# Patient Record
Sex: MALE | Race: OTHER | HISPANIC OR LATINO
[De-identification: names, ages, dates, MRNs, and addresses within clinical notes are randomized per-mention and may not be internally consistent; named-entity substitution may affect disease eponyms.]

---

## 2021-03-09 PROBLEM — Z00.00 ENCOUNTER FOR PREVENTIVE HEALTH EXAMINATION: Status: ACTIVE | Noted: 2021-03-09

## 2021-03-16 ENCOUNTER — NON-APPOINTMENT (OUTPATIENT)
Age: 86
End: 2021-03-16

## 2021-03-16 ENCOUNTER — APPOINTMENT (OUTPATIENT)
Dept: CARDIOLOGY | Facility: CLINIC | Age: 86
End: 2021-03-16
Payer: MEDICARE

## 2021-03-16 VITALS
RESPIRATION RATE: 12 BRPM | WEIGHT: 160 LBS | TEMPERATURE: 97.5 F | DIASTOLIC BLOOD PRESSURE: 80 MMHG | OXYGEN SATURATION: 99 % | BODY MASS INDEX: 25.71 KG/M2 | HEART RATE: 75 BPM | HEIGHT: 66 IN | SYSTOLIC BLOOD PRESSURE: 170 MMHG

## 2021-03-16 DIAGNOSIS — Z82.49 FAMILY HISTORY OF ISCHEMIC HEART DISEASE AND OTHER DISEASES OF THE CIRCULATORY SYSTEM: ICD-10-CM

## 2021-03-16 DIAGNOSIS — Z86.39 PERSONAL HISTORY OF OTHER ENDOCRINE, NUTRITIONAL AND METABOLIC DISEASE: ICD-10-CM

## 2021-03-16 PROCEDURE — 93000 ELECTROCARDIOGRAM COMPLETE: CPT

## 2021-03-16 PROCEDURE — 99204 OFFICE O/P NEW MOD 45 MIN: CPT

## 2021-03-16 RX ORDER — LISINOPRIL 20 MG/1
20 TABLET ORAL DAILY
Refills: 0 | Status: DISCONTINUED | COMMUNITY
End: 2021-03-16

## 2021-03-16 RX ORDER — BLOOD-GLUCOSE METER
KIT MISCELLANEOUS
Qty: 1 | Refills: 0 | Status: ACTIVE | COMMUNITY
Start: 2021-03-16 | End: 1900-01-01

## 2021-03-16 RX ORDER — METFORMIN HYDROCHLORIDE 850 MG/1
850 TABLET, COATED ORAL DAILY
Refills: 0 | Status: ACTIVE | COMMUNITY

## 2021-03-16 RX ORDER — ATENOLOL 100 MG/1
100 TABLET ORAL DAILY
Refills: 0 | Status: ACTIVE | COMMUNITY

## 2021-03-16 RX ORDER — ATORVASTATIN CALCIUM 40 MG/1
40 TABLET, FILM COATED ORAL DAILY
Refills: 0 | Status: ACTIVE | COMMUNITY

## 2021-03-16 RX ORDER — LISINOPRIL 40 MG/1
40 TABLET ORAL
Qty: 90 | Refills: 1 | Status: ACTIVE | COMMUNITY
Start: 2021-03-16 | End: 1900-01-01

## 2021-03-16 NOTE — ASSESSMENT
[FreeTextEntry1] : 86 yo male with hypertension, hyperlipidemia, and DM type 2, who presents today for evaluation of cardiac murmur. \par ECG today demonstrated sinus rhythm with RBBB and LAD.\par \par Will perform echocardiogram to assess LV function and structural heart disease.\par WIll defer stress test at this time pending echo results and absence of exertional complaints.\par Pending echo results, will determine if further cardiac work-up or intervention is clinically indicated.\par \par BP is currently not well controlled on current regimen of lisinopril 20 mg po daily and atenolol 100 mg po daily.\par Will increase lisinopril to 40 mg po daily.\par Patient was also given Rx for home BP machine. Patient to call if BP remains elevated.\par

## 2021-03-16 NOTE — HISTORY OF PRESENT ILLNESS
[FreeTextEntry1] : 86 yo male with hypertension, hyperlipidemia, and DM type 2, who presents today for evaluation of cardiac murmur. Patient denies any exertional complaints. He walks 2-3x/week for 45-60 minutes without chest pain or dyspnea. Patient denies palpitations, syncope, edema, melena, hematochezia, or hematemesis. He denies any prior cardiac evaluation.\par \par PMD: Harshil Grijalva MD ([facility])

## 2021-03-16 NOTE — PHYSICAL EXAM
[General Appearance - Well Developed] : well developed [General Appearance - Well Nourished] : well nourished [General Appearance - In No Acute Distress] : no acute distress [Normal Conjunctiva] : the conjunctiva exhibited no abnormalities [No Oral Cyanosis] : no oral cyanosis [Normal Rate] : normal [Rhythm Regular] : regular [Normal S1] : normal S1 [Normal S2] : normal S2 [III] : a grade 3 [2+] : left 2+ [No Pitting Edema] : no pitting edema present [] : no respiratory distress [Respiration, Rhythm And Depth] : normal respiratory rhythm and effort [Auscultation Breath Sounds / Voice Sounds] : lungs were clear to auscultation bilaterally [Abnormal Walk] : normal gait [Nail Clubbing] : no clubbing of the fingernails [Cyanosis, Localized] : no localized cyanosis [Oriented To Time, Place, And Person] : oriented to person, place, and time [Affect] : the affect was normal [Mood] : the mood was normal [FreeTextEntry1] : No JVD present [S3] : no S3 [S4] : no S4 [Right Carotid Bruit] : no bruit heard over the right carotid [Left Carotid Bruit] : no bruit heard over the left carotid

## 2021-04-25 ENCOUNTER — RESULT REVIEW (OUTPATIENT)
Age: 86
End: 2021-04-25

## 2021-04-27 ENCOUNTER — NON-APPOINTMENT (OUTPATIENT)
Age: 86
End: 2021-04-27

## 2021-05-03 ENCOUNTER — APPOINTMENT (OUTPATIENT)
Dept: CARDIOLOGY | Facility: CLINIC | Age: 86
End: 2021-05-03
Payer: MEDICARE

## 2021-05-03 VITALS
BODY MASS INDEX: 25.99 KG/M2 | HEART RATE: 63 BPM | DIASTOLIC BLOOD PRESSURE: 88 MMHG | OXYGEN SATURATION: 99 % | RESPIRATION RATE: 12 BRPM | SYSTOLIC BLOOD PRESSURE: 162 MMHG | WEIGHT: 161 LBS

## 2021-05-03 DIAGNOSIS — Z86.79 PERSONAL HISTORY OF OTHER DISEASES OF THE CIRCULATORY SYSTEM: ICD-10-CM

## 2021-05-03 DIAGNOSIS — I10 ESSENTIAL (PRIMARY) HYPERTENSION: ICD-10-CM

## 2021-05-03 DIAGNOSIS — E78.5 HYPERLIPIDEMIA, UNSPECIFIED: ICD-10-CM

## 2021-05-03 DIAGNOSIS — I35.0 NONRHEUMATIC AORTIC (VALVE) STENOSIS: ICD-10-CM

## 2021-05-03 PROCEDURE — 99214 OFFICE O/P EST MOD 30 MIN: CPT

## 2021-05-03 NOTE — ASSESSMENT
[FreeTextEntry1] : 86 yo male with hypertension, hyperlipidemia, DM type 2, and recently diagnosed severe aortic stenosis per 4/26/21 echocardiogram (LVEF 64%, grade II diastolic dysfunction, severe AS with JENN 0.9 cm2, peak grade 65.3 mmHg, mean grad 37.1 mmHg, DVI 0.22, and mild AR).\par \par Given severe aortic stenosis, will refer patient for consultation with Dr. Parada for possible TAVR evaluation. \par Will also arrange for treadmill stress ECG to determine if patient is truly asymptomatic with exertion.\par \par BP is currently adequate on regimen of lisinopril 40 mg po daily and atenolol 100 mg po daily.\par Will continue to monitor on current management.\par \par Will continue atorvastatin 40 mg po daily for hyperlipidemia management at this time.

## 2021-05-03 NOTE — CARDIOLOGY SUMMARY
[de-identified] : 3/16/21 ECG: Sinus rhythm, rate 61 bpm, RBBB, LAD  [de-identified] : 4/26/21 Echo: Normal LV size and systolic function, LVEF 64%. Grade II diastolic dysfunction. Severe AS (JENN 0.9 cm2, peak grade 65.3 mmHg, mean grad 37.1 mmHg, DVI 0.22). Mild AR. Mildly thickened MV. Mod MAC. Mild MR. Mild TR. Mild pulm HTN with PASP 42 mmHg.

## 2021-05-03 NOTE — HISTORY OF PRESENT ILLNESS
[FreeTextEntry1] : 86 yo male with hypertension, hyperlipidemia, DM type 2, and recently diagnosed severe aortic stenosis per 4/26/21 echo. Patient continues to deny any exertional complaints. Patient denies palpitations, syncope, edema, melena, hematochezia, or hematemesis. Patient reports -120s at home since increase in lisinopril to 40 mg po daily.\par \par PMD: Harshil Grijalva MD ([facility])

## 2021-05-03 NOTE — PHYSICAL EXAM
[Normal Rate] : normal [Rhythm Regular] : regular [Normal S1] : normal S1 [Normal S2] : normal S2 [III] : a grade 3 [No Pitting Edema] : no pitting edema present [2+] : left 2+ [Normal] : alert and oriented, normal memory [Right Carotid Bruit] : no bruit heard over the right carotid [Left Carotid Bruit] : no bruit heard over the left carotid